# Patient Record
Sex: MALE | Race: WHITE | NOT HISPANIC OR LATINO | Employment: FULL TIME | URBAN - METROPOLITAN AREA
[De-identification: names, ages, dates, MRNs, and addresses within clinical notes are randomized per-mention and may not be internally consistent; named-entity substitution may affect disease eponyms.]

---

## 2017-01-26 ENCOUNTER — TELEPHONE (OUTPATIENT)
Dept: FAMILY MEDICINE | Facility: CLINIC | Age: 27
End: 2017-01-26

## 2017-01-26 NOTE — TELEPHONE ENCOUNTER
Call returned to pt.  Pt stated he needed a note to get released back to work.  Pt was advised to go to the person who had him out of work for he has not seen Dr. Cherry in over a year and he would need to come in to be cleared by Dr. Cherry.

## 2017-01-26 NOTE — TELEPHONE ENCOUNTER
----- Message from Ashanti Bassett sent at 1/26/2017 11:06 AM CST -----  Contact: [phone] / self   Patient requesting to speak with you regarding getting released to go back to work. Please advise.

## 2017-07-11 ENCOUNTER — OFFICE VISIT (OUTPATIENT)
Dept: FAMILY MEDICINE | Facility: CLINIC | Age: 27
End: 2017-07-11
Payer: COMMERCIAL

## 2017-07-11 VITALS
DIASTOLIC BLOOD PRESSURE: 80 MMHG | TEMPERATURE: 98 F | HEART RATE: 81 BPM | BODY MASS INDEX: 26.61 KG/M2 | OXYGEN SATURATION: 97 % | WEIGHT: 190.06 LBS | SYSTOLIC BLOOD PRESSURE: 154 MMHG | HEIGHT: 71 IN

## 2017-07-11 DIAGNOSIS — F40.10 SOCIAL ANXIETY DISORDER: Primary | ICD-10-CM

## 2017-07-11 DIAGNOSIS — R00.2 PALPITATIONS: ICD-10-CM

## 2017-07-11 DIAGNOSIS — R03.0 TEMPORARY HIGH BLOOD PRESSURE: ICD-10-CM

## 2017-07-11 PROCEDURE — 93005 ELECTROCARDIOGRAM TRACING: CPT | Mod: S$GLB,,, | Performed by: NURSE PRACTITIONER

## 2017-07-11 PROCEDURE — 93010 ELECTROCARDIOGRAM REPORT: CPT | Mod: S$GLB,,, | Performed by: INTERNAL MEDICINE

## 2017-07-11 PROCEDURE — 99213 OFFICE O/P EST LOW 20 MIN: CPT | Mod: S$GLB,,, | Performed by: NURSE PRACTITIONER

## 2017-07-11 PROCEDURE — 99999 PR PBB SHADOW E&M-EST. PATIENT-LVL IV: CPT | Mod: PBBFAC,,, | Performed by: NURSE PRACTITIONER

## 2017-07-11 RX ORDER — PROPRANOLOL HYDROCHLORIDE 10 MG/1
10 TABLET ORAL 2 TIMES DAILY PRN
Qty: 60 TABLET | Refills: 0 | Status: SHIPPED | OUTPATIENT
Start: 2017-07-11 | End: 2017-08-18 | Stop reason: SDUPTHER

## 2017-07-11 RX ORDER — ESCITALOPRAM OXALATE 10 MG/1
10 TABLET ORAL DAILY
Qty: 30 TABLET | Refills: 1 | Status: SHIPPED | OUTPATIENT
Start: 2017-07-11 | End: 2017-08-18 | Stop reason: SDUPTHER

## 2017-07-11 NOTE — PATIENT INSTRUCTIONS
Understanding Social Phobia (Social Anxiety Disorder)     Talk to your healthcare provider about treatment for social phobia.     You have to give a presentation next week. Just thinking about it makes your heart race. Your throat gets tight, and you can hardly breathe. Sometimes, you even feel faint. Speaking in front of a group makes most people nervous, but your fear is beyond reason. This is nothing to be ashamed of. You may have an anxiety disorder known as social phobia. Talk to your doctor or mental health professional. He or she can offer treatment and support.  What is social phobia?  Social phobia (also called social anxiety disorder) is an intense fear of being humiliated in a social or work setting. Even if you realize that your worries are irrational, you still expect people to  and think poorly of you. To avoid the anxiety, you may stay away from group settings. This avoidance can create problems with relationships, your job, and many other parts of your life. And it can make life much less enjoyable.  What causes it?  The exact cause of social phobia isnt known. The disorder may run in families. The balance of certain chemicals in your brain may also play a role. And an embarrassing or traumatic event may trigger social phobias in some people. Sometimes, the social phobia may not appear until years after this event.  How does social phobia feel?  People with social phobia are very conscious of how others see them. In a social setting, symptoms may include:  · Self-conscious thoughts (You may think all eyes are on you)  · Shakiness, nervousness, or a trembling voice  · Sweating and blushing  · An increased heartbeat  · Shortness of breath  · A headache or stomachache  · Muscle tension  · A dry mouth  Getting help  Asking for help may be very hard for you, but the effort will be worth it. Certain medications may lessen your symptoms. And behavioral therapy may help you conquer your fears. This  involves working with your therapist to learn how to relax when you feel anxious. Youll also slowly begin to confront your fears. At first, you may just think about the situations that scare you. Later, you may face them in person. For instance, you might start by giving a speech in front of one friend, and then gradually in front of larger groups. With each step, youll become less afraid.   Date Last Reviewed: 2/5/2015 © 2000-2016 [email or web address]. [address] [place] [address], [place] [address]. All rights reserved. This information is not intended as a substitute for professional medical care. Always follow your healthcare professional's instructions.

## 2017-07-11 NOTE — PROGRESS NOTES
Subjective:       Patient ID: Andrew Montes is a 27 y.o. male.    Chief Complaint: Anxiety (started in 2008 but has gotten worse last 6 to 7 months,feel nerves,can't focus,have not had an appetite in last 2 days,hands sweats alot when around people,)    ###NEW PATIENT to me - old patient of Dr. Cherry###    Patient is here today with complaint of social anxiety/social phobia but symptoms are worsening and starting to affect personal and social life.  Reports a history of this in the past and was seeing Dr. Cherry and was put on Zoloft in the past but found that the medication was ineffective and did not help.  Reports he has managed to control symptoms in the past but now reports that he is no longer able to control the anxiety and he is completely avoiding social gatherings and avoiding hanging out with friends, etc.  Very anxiety, palpitations and increased sweating at work.  He has had labs checked in past and thyroid labs were normal.  Would like to trial a different medication but can not have any medication that causes sedation due to working as a [employer].            Previous Medications    MULTIVITAMIN (THERAGRAN) PER TABLET    Take 1 tablet by mouth once daily.       Past Medical History:   Diagnosis Date    Hyperhidrosis     Kidney stone     Pars defect of lumbar spine     L4    Social anxiety disorder        Past Surgical History:   Procedure Laterality Date    EYE SURGERY      foreign body removal    KIDNEY STONE SURGERY         Family History   Problem Relation Age of Onset    Anxiety disorder Mother     No Known Problems Father     No Known Problems Sister        Social History     Social History    Marital status: Single     Spouse name: N/A    Number of children: N/A    Years of education: N/A     Occupational History          Social History Main Topics    Smoking status: Never Smoker    Smokeless tobacco: Current User    Alcohol use No    Drug use: No    Sexual activity: Not  "Asked     Other Topics Concern    None     Social History Narrative    None       Review of Systems   Constitutional: Negative for activity change, appetite change, fatigue, fever and unexpected weight change.   HENT: Negative for congestion, ear pain, mouth sores, nosebleeds, postnasal drip, rhinorrhea, sinus pressure, sneezing, sore throat, trouble swallowing and voice change.    Eyes: Negative.    Respiratory: Negative for cough, chest tightness and shortness of breath.    Cardiovascular: Negative for chest pain, palpitations and leg swelling.   Gastrointestinal: Negative.  Negative for abdominal pain, blood in stool, constipation, diarrhea, nausea and vomiting.   Endocrine: Negative.    Genitourinary: Negative for difficulty urinating, dysuria, flank pain, hematuria and urgency.   Musculoskeletal: Negative for arthralgias, back pain, gait problem, joint swelling, myalgias and neck pain.   Skin: Negative for color change, rash and wound.   Allergic/Immunologic: Negative for immunocompromised state.   Neurological: Negative for dizziness, tremors, seizures, syncope, speech difficulty and headaches.   Hematological: Negative for adenopathy. Does not bruise/bleed easily.   Psychiatric/Behavioral: Positive for decreased concentration, dysphoric mood and sleep disturbance. Negative for behavioral problems, hallucinations, self-injury and suicidal ideas. The patient is nervous/anxious.          Objective:     Vitals:    07/11/17 1415   BP: (!) 154/80   BP Location: Left arm   Patient Position: Sitting   BP Method: Manual   Pulse: 81   Temp: 97.7 °F (36.5 °C)   TempSrc: Oral   SpO2: 97%   Weight: 86.2 kg (190 lb 0.6 oz)   Height: 5' 11" (1.803 m)          Physical Exam   Constitutional: He is oriented to person, place, and time. He appears well-developed and well-nourished.   HENT:   Head: Normocephalic.   Right Ear: External ear normal.   Left Ear: External ear normal.   Nose: Nose normal.   Mouth/Throat: Oropharynx " is clear and moist. No oropharyngeal exudate.   Eyes: EOM are normal. Pupils are equal, round, and reactive to light. Right eye exhibits no discharge. Left eye exhibits no discharge. No scleral icterus.   Neck: Normal range of motion. Neck supple. No tracheal deviation present. No thyromegaly present.   Cardiovascular: Normal rate, regular rhythm and normal heart sounds.    No murmur heard.  Pulmonary/Chest: Effort normal and breath sounds normal. No respiratory distress.   Abdominal: Soft. He exhibits no distension.   Musculoskeletal: Normal range of motion. He exhibits no edema.   Lymphadenopathy:     He has no cervical adenopathy.   Neurological: He is alert and oriented to person, place, and time. Coordination normal.   Skin: Skin is warm and dry. No rash noted.   Psychiatric: His behavior is normal. His mood appears anxious. His speech is rapid and/or pressured. He is not actively hallucinating.         Assessment:         ICD-10-CM ICD-9-CM   1. Social anxiety disorder F40.10 300.23   2. Palpitations R00.2 785.1   3. Temporary high blood pressure R03.0 796.2       Plan:       Social anxiety disorder  -  Long discussion on medication options.  Will trial Lexapro 10 mg daily - advised that it will take about 2 weeks to note the benefits of medication.  Advised patient that also due to acute social anxiety related to certain social gatherings that cause palpitations, excess sweating, hands shaking, etc - will try taking Propranolol 10 mg 30 minutes prior to event to see if this may help to control symptoms.  Return in 6 weeks to recheck  -     escitalopram oxalate (LEXAPRO) 10 MG tablet; Take 1 tablet (10 mg total) by mouth once daily.  Dispense: 30 tablet; Refill: 1    Palpitations  -  Baseline EKG:  NSR with sinus arrhythmia - normal EKG  -     propranolol (INDERAL) 10 MG tablet; Take 1 tablet (10 mg total) by mouth 2 (two) times daily as needed (palpitations, anxiousness).  Dispense: 60 tablet; Refill: 0  -      IN OFFICE EKG 12-LEAD (to Muse)    Temporary high blood pressure  -  Suspect related to anxiety since review of other blood pressures during other [facility] visits were within range - will recheck in 6 weeks.      Return in about 6 weeks (around 8/22/2017) for follow up.     Patient's Medications   New Prescriptions    ESCITALOPRAM OXALATE (LEXAPRO) 10 MG TABLET    Take 1 tablet (10 mg total) by mouth once daily.    PROPRANOLOL (INDERAL) 10 MG TABLET    Take 1 tablet (10 mg total) by mouth 2 (two) times daily as needed (palpitations, anxiousness).   Previous Medications    MULTIVITAMIN (THERAGRAN) PER TABLET    Take 1 tablet by mouth once daily.   Modified Medications    No medications on file   Discontinued Medications    OXYBUTYNIN (DITROPAN) 5 MG TAB    Take 1 tablet (5 mg total) by mouth 3 (three) times daily as needed (bladder spasms).    TAMSULOSIN (FLOMAX) 0.4 MG CP24    Take 1 capsule (0.4 mg total) by mouth after dinner.

## 2017-07-13 ENCOUNTER — OFFICE VISIT (OUTPATIENT)
Dept: FAMILY MEDICINE | Facility: CLINIC | Age: 27
End: 2017-07-13
Payer: COMMERCIAL

## 2017-07-13 ENCOUNTER — LAB VISIT (OUTPATIENT)
Dept: LAB | Facility: HOSPITAL | Age: 27
End: 2017-07-13
Attending: NURSE PRACTITIONER
Payer: COMMERCIAL

## 2017-07-13 VITALS
BODY MASS INDEX: 26.51 KG/M2 | HEIGHT: 71 IN | DIASTOLIC BLOOD PRESSURE: 80 MMHG | OXYGEN SATURATION: 99 % | HEART RATE: 75 BPM | SYSTOLIC BLOOD PRESSURE: 130 MMHG | WEIGHT: 189.38 LBS | TEMPERATURE: 98 F

## 2017-07-13 DIAGNOSIS — R06.02 SHORTNESS OF BREATH: ICD-10-CM

## 2017-07-13 DIAGNOSIS — R00.2 PALPITATIONS: ICD-10-CM

## 2017-07-13 DIAGNOSIS — R07.89 ATYPICAL CHEST PAIN: ICD-10-CM

## 2017-07-13 DIAGNOSIS — F41.0 PANIC ATTACK: Primary | ICD-10-CM

## 2017-07-13 LAB
ALBUMIN SERPL BCP-MCNC: 4.7 G/DL
ALP SERPL-CCNC: 61 U/L
ALT SERPL W/O P-5'-P-CCNC: 53 U/L
ANION GAP SERPL CALC-SCNC: 11 MMOL/L
AST SERPL-CCNC: 31 U/L
BASOPHILS # BLD AUTO: 0.03 K/UL
BASOPHILS NFR BLD: 0.3 %
BILIRUB SERPL-MCNC: 1.7 MG/DL
BUN SERPL-MCNC: 8 MG/DL
CALCIUM SERPL-MCNC: 9.7 MG/DL
CHLORIDE SERPL-SCNC: 104 MMOL/L
CK SERPL-CCNC: 122 U/L
CO2 SERPL-SCNC: 28 MMOL/L
CREAT SERPL-MCNC: 1 MG/DL
DIFFERENTIAL METHOD: ABNORMAL
EOSINOPHIL # BLD AUTO: 0 K/UL
EOSINOPHIL NFR BLD: 0.3 %
ERYTHROCYTE [DISTWIDTH] IN BLOOD BY AUTOMATED COUNT: 12 %
EST. GFR  (AFRICAN AMERICAN): >60 ML/MIN/1.73 M^2
EST. GFR  (NON AFRICAN AMERICAN): >60 ML/MIN/1.73 M^2
GLUCOSE SERPL-MCNC: 103 MG/DL
HCT VFR BLD AUTO: 41.5 %
HGB BLD-MCNC: 14.9 G/DL
LYMPHOCYTES # BLD AUTO: 2.4 K/UL
LYMPHOCYTES NFR BLD: 20.4 %
MCH RBC QN AUTO: 32 PG
MCHC RBC AUTO-ENTMCNC: 35.9 %
MCV RBC AUTO: 89 FL
MONOCYTES # BLD AUTO: 0.9 K/UL
MONOCYTES NFR BLD: 7.8 %
NEUTROPHILS # BLD AUTO: 8.5 K/UL
NEUTROPHILS NFR BLD: 71 %
PLATELET # BLD AUTO: 261 K/UL
PMV BLD AUTO: 10 FL
POTASSIUM SERPL-SCNC: 3.7 MMOL/L
PROT SERPL-MCNC: 7.4 G/DL
RBC # BLD AUTO: 4.66 M/UL
SODIUM SERPL-SCNC: 143 MMOL/L
TSH SERPL DL<=0.005 MIU/L-ACNC: 0.78 UIU/ML
WBC # BLD AUTO: 11.96 K/UL

## 2017-07-13 PROCEDURE — 99999 PR PBB SHADOW E&M-EST. PATIENT-LVL IV: CPT | Mod: PBBFAC,,, | Performed by: NURSE PRACTITIONER

## 2017-07-13 PROCEDURE — 36415 COLL VENOUS BLD VENIPUNCTURE: CPT

## 2017-07-13 PROCEDURE — 82550 ASSAY OF CK (CPK): CPT

## 2017-07-13 PROCEDURE — 99213 OFFICE O/P EST LOW 20 MIN: CPT | Mod: S$GLB,,, | Performed by: NURSE PRACTITIONER

## 2017-07-13 PROCEDURE — 84443 ASSAY THYROID STIM HORMONE: CPT

## 2017-07-13 PROCEDURE — 85025 COMPLETE CBC W/AUTO DIFF WBC: CPT

## 2017-07-13 PROCEDURE — 80053 COMPREHEN METABOLIC PANEL: CPT

## 2017-07-13 RX ORDER — HYDROXYZINE PAMOATE 25 MG/1
25 CAPSULE ORAL EVERY 6 HOURS PRN
Qty: 30 CAPSULE | Refills: 1 | Status: SHIPPED | OUTPATIENT
Start: 2017-07-13 | End: 2017-08-18 | Stop reason: SDUPTHER

## 2017-07-13 NOTE — PROGRESS NOTES
Subjective:       Patient ID: Andrew Montes is a 27 y.o. male.    Chief Complaint: Chest Pain (started this morning with shortness of breath, body feels like wanting to cramp up, muscles feels sore throughout pt body)    Patient is here today with complaint he was supposed to report to work this morning but reports he starting have chest pains/tightness with palpitations, shortness of breath and muscle aches.  Patient has not been working in the heat over the past week so is not symptomatic of heat exhaustion.  Patient just had an EKG 2 days ago for palpitations and anxiety and EKG was NSR with sinus arrhythmia - normal EKG.  Patient has acute social anxiety disorder - he was just started on Lexapro 2 days ago and Propranolol 10 mg prn palpitations.  Patient did not take the Propranolol today for the palpitations - state she has not taken the Propranolol since prescribed.  HR is normal at 75 today.        Chest Pain    This is a new problem. The current episode started today. The onset quality is sudden. The problem occurs constantly. The problem has been unchanged. The pain is present in the substernal region. The pain is at a severity of 3/10. The quality of the pain is described as tightness. The pain does not radiate. Associated symptoms include palpitations and shortness of breath. Pertinent negatives include no abdominal pain, back pain, cough, diaphoresis, dizziness, fever, headaches, irregular heartbeat, nausea, near-syncope, syncope or vomiting. Associated symptoms comments: Myalgias, anxiousness. He has tried nothing for the symptoms. Risk factors include male gender and stress.   His past medical history is significant for anxiety/panic attacks.   Pertinent negatives for past medical history include no seizures. Prior workup: Had EKG 2 days ago - normal sinus rhythm with sinus arrhythmia - normal EKG.       Previous Medications    ESCITALOPRAM OXALATE (LEXAPRO) 10 MG TABLET    Take 1 tablet (10 mg total)  by mouth once daily.    MULTIVITAMIN (THERAGRAN) PER TABLET    Take 1 tablet by mouth once daily.    PROPRANOLOL (INDERAL) 10 MG TABLET    Take 1 tablet (10 mg total) by mouth 2 (two) times daily as needed (palpitations, anxiousness).       Past Medical History:   Diagnosis Date    Hyperhidrosis     Kidney stone     Pars defect of lumbar spine     L4    Social anxiety disorder        Past Surgical History:   Procedure Laterality Date    EYE SURGERY      foreign body removal    KIDNEY STONE SURGERY         Family History   Problem Relation Age of Onset    Anxiety disorder Mother     No Known Problems Father     No Known Problems Sister        Social History     Social History    Marital status: Single     Spouse name: N/A    Number of children: N/A    Years of education: N/A     Occupational History    [employer]      Social History Main Topics    Smoking status: Never Smoker    Smokeless tobacco: Current User    Alcohol use No    Drug use: No    Sexual activity: Not Asked     Other Topics Concern    None     Social History Narrative    None       Review of Systems   Constitutional: Negative for activity change, appetite change, diaphoresis, fatigue, fever and unexpected weight change.   HENT: Negative for congestion, ear pain, mouth sores, nosebleeds, postnasal drip, rhinorrhea, sinus pressure, sneezing, sore throat, trouble swallowing and voice change.    Eyes: Negative.    Respiratory: Positive for shortness of breath. Negative for cough and chest tightness.    Cardiovascular: Positive for chest pain and palpitations. Negative for leg swelling, syncope and near-syncope.   Gastrointestinal: Negative.  Negative for abdominal pain, blood in stool, constipation, diarrhea, nausea and vomiting.   Endocrine: Negative.    Genitourinary: Negative for difficulty urinating, dysuria, flank pain, hematuria and urgency.   Musculoskeletal: Positive for myalgias. Negative for arthralgias, back pain, gait  "problem, joint swelling and neck pain.   Skin: Negative for color change, rash and wound.   Allergic/Immunologic: Negative for immunocompromised state.   Neurological: Negative for dizziness, tremors, seizures, syncope, speech difficulty and headaches.   Hematological: Negative for adenopathy. Does not bruise/bleed easily.   Psychiatric/Behavioral: Negative for behavioral problems, dysphoric mood, sleep disturbance and suicidal ideas. The patient is nervous/anxious.          Objective:     Vitals:    07/13/17 1038   BP: 130/80   BP Location: Right arm   Patient Position: Sitting   BP Method: Manual   Pulse: 75   Temp: 98.3 °F (36.8 °C)   TempSrc: Oral   SpO2: 99%   Weight: 85.9 kg (189 lb 6 oz)   Height: 5' 11" (1.803 m)          Physical Exam   Constitutional: He is oriented to person, place, and time. He appears well-developed and well-nourished.   HENT:   Head: Normocephalic.   Right Ear: External ear normal.   Left Ear: External ear normal.   Nose: Nose normal.   Mouth/Throat: Oropharynx is clear and moist. No oropharyngeal exudate.   Eyes: EOM are normal. Pupils are equal, round, and reactive to light. Right eye exhibits no discharge. Left eye exhibits no discharge. No scleral icterus.   Neck: Normal range of motion. Neck supple. No tracheal deviation present. No thyromegaly present.   Cardiovascular: Normal rate, regular rhythm and normal heart sounds.    No murmur heard.  Pulmonary/Chest: Effort normal and breath sounds normal. No respiratory distress.   Abdominal: Soft. He exhibits no distension.   Musculoskeletal: Normal range of motion. He exhibits no edema.   Lymphadenopathy:     He has no cervical adenopathy.   Neurological: He is alert and oriented to person, place, and time. Coordination normal.   Skin: Skin is warm and dry. No rash noted.   Psychiatric: His behavior is normal. His mood appears anxious. His speech is rapid and/or pressured. He is not actively hallucinating.         Assessment:         " ICD-10-CM ICD-9-CM   1. Panic attack F41.0 300.01   2. Palpitations R00.2 785.1   3. Shortness of breath R06.02 786.05   4. Atypical chest pain R07.89 786.59       Plan:       Panic attack  -  Take Vistaril as needed for acute panic attack.  -     hydrOXYzine pamoate (VISTARIL) 25 MG Cap; Take 1 capsule (25 mg total) by mouth every 6 (six) hours as needed (anxiety).  Dispense: 30 capsule; Refill: 1    Palpitations  -  Patient had normal thyroid when checked but that was over 1 year ago - will get labs to rule out other causes of palpitation and anxiety.    -     Comprehensive metabolic panel; Future; Expected date: 07/13/2017  -     TSH; Future; Expected date: 07/13/2017  -     CBC auto differential; Future; Expected date: 07/13/2017  -     CK; Future; Expected date: 07/13/2017    Shortness of breath    Atypical Chest Pain  -  Suspect this is secondary to anxiety - but will get some labs and CK level.  Advised to take Propranolol for palpitations and Vistaril as needed for anxiety attack.  If chest pain worsens or does not get relief, go to ER.    Return in about 6 weeks (around 8/24/2017).     Patient's Medications   New Prescriptions    HYDROXYZINE PAMOATE (VISTARIL) 25 MG CAP    Take 1 capsule (25 mg total) by mouth every 6 (six) hours as needed (anxiety).   Previous Medications    ESCITALOPRAM OXALATE (LEXAPRO) 10 MG TABLET    Take 1 tablet (10 mg total) by mouth once daily.    MULTIVITAMIN (THERAGRAN) PER TABLET    Take 1 tablet by mouth once daily.    PROPRANOLOL (INDERAL) 10 MG TABLET    Take 1 tablet (10 mg total) by mouth 2 (two) times daily as needed (palpitations, anxiousness).   Modified Medications    No medications on file   Discontinued Medications    No medications on file

## 2017-08-18 ENCOUNTER — OFFICE VISIT (OUTPATIENT)
Dept: FAMILY MEDICINE | Facility: CLINIC | Age: 27
End: 2017-08-18
Payer: COMMERCIAL

## 2017-08-18 VITALS
HEIGHT: 71 IN | WEIGHT: 185.19 LBS | BODY MASS INDEX: 25.93 KG/M2 | SYSTOLIC BLOOD PRESSURE: 120 MMHG | DIASTOLIC BLOOD PRESSURE: 78 MMHG | TEMPERATURE: 98 F | OXYGEN SATURATION: 99 %

## 2017-08-18 DIAGNOSIS — R00.2 PALPITATIONS: ICD-10-CM

## 2017-08-18 DIAGNOSIS — F40.10 SOCIAL ANXIETY DISORDER: ICD-10-CM

## 2017-08-18 DIAGNOSIS — F41.0 PANIC ATTACK: ICD-10-CM

## 2017-08-18 PROCEDURE — 3008F BODY MASS INDEX DOCD: CPT | Mod: S$GLB,,, | Performed by: NURSE PRACTITIONER

## 2017-08-18 PROCEDURE — 99999 PR PBB SHADOW E&M-EST. PATIENT-LVL III: CPT | Mod: PBBFAC,,, | Performed by: NURSE PRACTITIONER

## 2017-08-18 PROCEDURE — 99213 OFFICE O/P EST LOW 20 MIN: CPT | Mod: S$GLB,,, | Performed by: NURSE PRACTITIONER

## 2017-08-18 RX ORDER — PROPRANOLOL HYDROCHLORIDE 10 MG/1
10 TABLET ORAL 2 TIMES DAILY PRN
Qty: 90 TABLET | Refills: 0 | Status: SHIPPED | OUTPATIENT
Start: 2017-08-18 | End: 2017-11-03 | Stop reason: SDUPTHER

## 2017-08-18 RX ORDER — ESCITALOPRAM OXALATE 10 MG/1
10 TABLET ORAL DAILY
Qty: 90 TABLET | Refills: 0 | Status: SHIPPED | OUTPATIENT
Start: 2017-08-18 | End: 2017-11-03 | Stop reason: SDUPTHER

## 2017-08-18 RX ORDER — HYDROXYZINE PAMOATE 25 MG/1
25 CAPSULE ORAL EVERY 6 HOURS PRN
Qty: 90 CAPSULE | Refills: 0 | Status: SHIPPED | OUTPATIENT
Start: 2017-08-18 | End: 2017-11-03 | Stop reason: SDUPTHER

## 2017-08-18 NOTE — PROGRESS NOTES
Subjective:       Patient ID: Andrew Montes is a 27 y.o. male.    Chief Complaint: Follow-up (med check) and Medication Refill    Patient is here today for follow up.  Patient has social anxiety with panic attacks with symptoms manifesting as chest pains, palpitations and shortness of breath.  Patient was started on Lexapro 10 mg daily, Propranolol prn palpitations, and Vistaril prn panic attacks.  Patient reports that the medications are working well with resolution of symptoms.  Reports the Lexapro started working after 2 weeks and he rarely has to take the Vistaril now.            Previous Medications    ESCITALOPRAM OXALATE (LEXAPRO) 10 MG TABLET    Take 1 tablet (10 mg total) by mouth once daily.    HYDROXYZINE PAMOATE (VISTARIL) 25 MG CAP    Take 1 capsule (25 mg total) by mouth every 6 (six) hours as needed (anxiety).    MULTIVITAMIN (THERAGRAN) PER TABLET    Take 1 tablet by mouth once daily.    PROPRANOLOL (INDERAL) 10 MG TABLET    Take 1 tablet (10 mg total) by mouth 2 (two) times daily as needed (palpitations, anxiousness).       Past Medical History:   Diagnosis Date    Hyperhidrosis     Kidney stone     Pars defect of lumbar spine     L4    Social anxiety disorder        Past Surgical History:   Procedure Laterality Date    EYE SURGERY      foreign body removal    KIDNEY STONE SURGERY         Family History   Problem Relation Age of Onset    Anxiety disorder Mother     No Known Problems Father     No Known Problems Sister        Social History     Social History    Marital status: Single     Spouse name: N/A    Number of children: N/A    Years of education: N/A     Occupational History    [employer]      Social History Main Topics    Smoking status: Never Smoker    Smokeless tobacco: Current User    Alcohol use No    Drug use: No    Sexual activity: Not Asked     Other Topics Concern    None     Social History Narrative    None       Review of Systems   Constitutional: Positive for  "activity change. Negative for unexpected weight change.   HENT: Negative for hearing loss, rhinorrhea and trouble swallowing.    Eyes: Negative for discharge and visual disturbance.   Respiratory: Negative for chest tightness and wheezing.    Cardiovascular: Negative for chest pain and palpitations.   Gastrointestinal: Negative for blood in stool, constipation, diarrhea and vomiting.   Endocrine: Negative for polydipsia and polyuria.   Genitourinary: Negative for difficulty urinating, hematuria and urgency.   Musculoskeletal: Negative for arthralgias, joint swelling and neck pain.   Neurological: Negative for weakness and headaches.   Psychiatric/Behavioral: Negative for confusion and dysphoric mood.         Objective:     Vitals:    08/18/17 1133   BP: 120/78   BP Location: Right arm   Patient Position: Sitting   BP Method: Medium (Manual)   Temp: 98.3 °F (36.8 °C)   TempSrc: Oral   SpO2: 99%   Weight: 84 kg (185 lb 3 oz)   Height: 5' 11" (1.803 m)          Physical Exam   Constitutional: He is oriented to person, place, and time. He appears well-developed and well-nourished.   HENT:   Head: Normocephalic.   Right Ear: External ear normal.   Left Ear: External ear normal.   Nose: Nose normal.   Mouth/Throat: Oropharynx is clear and moist. No oropharyngeal exudate.   Eyes: EOM are normal. Pupils are equal, round, and reactive to light. Right eye exhibits no discharge. Left eye exhibits no discharge. No scleral icterus.   Neck: Normal range of motion. Neck supple. No tracheal deviation present. No thyromegaly present.   Cardiovascular: Normal rate, regular rhythm and normal heart sounds.    No murmur heard.  Pulmonary/Chest: Effort normal and breath sounds normal. No respiratory distress.   Abdominal: Soft. He exhibits no distension.   Musculoskeletal: Normal range of motion. He exhibits no edema.   Lymphadenopathy:     He has no cervical adenopathy.   Neurological: He is alert and oriented to person, place, and " time. Coordination normal.   Skin: Skin is warm and dry. No rash noted.   Psychiatric: He has a normal mood and affect. His behavior is normal.         Assessment:         ICD-10-CM ICD-9-CM   1. Social anxiety disorder F40.10 300.23   2. Panic attack F41.0 300.01   3. Palpitations R00.2 785.1       Plan:       Social anxiety disorder  -  Controlled on present medications and all physical symptoms have resolved.  Follow up in 3 months.  -     escitalopram oxalate (LEXAPRO) 10 MG tablet; Take 1 tablet (10 mg total) by mouth once daily.  Dispense: 90 tablet; Refill: 0    Panic attack  -     hydrOXYzine pamoate (VISTARIL) 25 MG Cap; Take 1 capsule (25 mg total) by mouth every 6 (six) hours as needed (anxiety).  Dispense: 90 capsule; Refill: 0    Palpitations  -  RESOLVED  -     propranolol (INDERAL) 10 MG tablet; Take 1 tablet (10 mg total) by mouth 2 (two) times daily as needed (palpitations, anxiousness).  Dispense: 90 tablet; Refill: 0      Return in about 3 months (around 11/18/2017) for med check.     Patient's Medications   New Prescriptions    No medications on file   Previous Medications    MULTIVITAMIN (THERAGRAN) PER TABLET    Take 1 tablet by mouth once daily.   Modified Medications    Modified Medication Previous Medication    ESCITALOPRAM OXALATE (LEXAPRO) 10 MG TABLET escitalopram oxalate (LEXAPRO) 10 MG tablet       Take 1 tablet (10 mg total) by mouth once daily.    Take 1 tablet (10 mg total) by mouth once daily.    HYDROXYZINE PAMOATE (VISTARIL) 25 MG CAP hydrOXYzine pamoate (VISTARIL) 25 MG Cap       Take 1 capsule (25 mg total) by mouth every 6 (six) hours as needed (anxiety).    Take 1 capsule (25 mg total) by mouth every 6 (six) hours as needed (anxiety).    PROPRANOLOL (INDERAL) 10 MG TABLET propranolol (INDERAL) 10 MG tablet       Take 1 tablet (10 mg total) by mouth 2 (two) times daily as needed (palpitations, anxiousness).    Take 1 tablet (10 mg total) by mouth 2 (two) times daily as needed  (palpitations, anxiousness).   Discontinued Medications    No medications on file

## 2017-09-08 ENCOUNTER — CLINICAL SUPPORT (OUTPATIENT)
Dept: OCCUPATIONAL MEDICINE | Facility: CLINIC | Age: 27
End: 2017-09-08

## 2017-09-08 DIAGNOSIS — Z02.89 ENCOUNTER FOR PHYSICAL EXAMINATION RELATED TO EMPLOYMENT: Primary | ICD-10-CM

## 2017-09-08 PROCEDURE — 99499 UNLISTED E&M SERVICE: CPT | Mod: S$GLB,,, | Performed by: PHYSICIAN ASSISTANT

## 2017-10-24 ENCOUNTER — OFFICE VISIT (OUTPATIENT)
Dept: FAMILY MEDICINE | Facility: CLINIC | Age: 27
End: 2017-10-24
Payer: COMMERCIAL

## 2017-10-24 VITALS
HEART RATE: 87 BPM | DIASTOLIC BLOOD PRESSURE: 60 MMHG | BODY MASS INDEX: 27.59 KG/M2 | WEIGHT: 197.06 LBS | OXYGEN SATURATION: 98 % | SYSTOLIC BLOOD PRESSURE: 114 MMHG | HEIGHT: 71 IN | TEMPERATURE: 99 F

## 2017-10-24 DIAGNOSIS — E80.4 GILBERT'S SYNDROME: ICD-10-CM

## 2017-10-24 DIAGNOSIS — M25.475 BILATERAL SWELLING OF FEET AND ANKLES: Primary | ICD-10-CM

## 2017-10-24 DIAGNOSIS — M25.471 BILATERAL SWELLING OF FEET AND ANKLES: Primary | ICD-10-CM

## 2017-10-24 DIAGNOSIS — F12.91 HISTORY OF MARIJUANA USE: ICD-10-CM

## 2017-10-24 DIAGNOSIS — M25.472 BILATERAL SWELLING OF FEET AND ANKLES: Primary | ICD-10-CM

## 2017-10-24 DIAGNOSIS — F41.0 PANIC ATTACK: ICD-10-CM

## 2017-10-24 DIAGNOSIS — R17 SCLERAL ICTERUS: ICD-10-CM

## 2017-10-24 DIAGNOSIS — M25.474 BILATERAL SWELLING OF FEET AND ANKLES: Primary | ICD-10-CM

## 2017-10-24 PROCEDURE — 99999 PR PBB SHADOW E&M-EST. PATIENT-LVL IV: CPT | Mod: PBBFAC,,, | Performed by: NURSE PRACTITIONER

## 2017-10-24 PROCEDURE — 99213 OFFICE O/P EST LOW 20 MIN: CPT | Mod: S$GLB,,, | Performed by: NURSE PRACTITIONER

## 2017-10-24 NOTE — PROGRESS NOTES
Subjective:       Patient ID: Andrew Montes is a 27 y.o. male.    Chief Complaint: Anxiety (started Friday just got nerves thinking about cousen wedding, was doing laundry, went in depression for 3 days but smoke marijuana friday and saturday night took Propranolol both nights before getting high.) and Foot Swelling (started saturday night both feet swollen and red)    Patient is a 27 year old male with Social Anxiety/Panic Disorder.  He is normally controlled on Lexapro 10 mg daily and Vistaril and Propranolol prn panic attack.  He reports Friday feeling very anxious with depressed mood and felt like he was having a panic attack.  He reports he took a Vistaril and Propranolol but then admits that he smoked marijuana both Friday and Saturday night.  States first time using marijuana in several years.  Report he does not know where the marijuana came from - given by a friend so unsure if it was laced with anything.  He reports he then went into a deep depression and stayed in bed.  He noted bilateral feet and ankle swelling - really red and swollen - but is improved today from over the weekend.      Patient does have  Gilbert Syndrome with elevated bilirubin noted for years and does get jaundice when stressed.  His sclera are yellowed today on exam.             Previous Medications    ESCITALOPRAM OXALATE (LEXAPRO) 10 MG TABLET    Take 1 tablet (10 mg total) by mouth once daily.    HYDROXYZINE PAMOATE (VISTARIL) 25 MG CAP    Take 1 capsule (25 mg total) by mouth every 6 (six) hours as needed (anxiety).    MULTIVITAMIN (THERAGRAN) PER TABLET    Take 1 tablet by mouth once daily.    PROPRANOLOL (INDERAL) 10 MG TABLET    Take 1 tablet (10 mg total) by mouth 2 (two) times daily as needed (palpitations, anxiousness).       Past Medical History:   Diagnosis Date    Hyperhidrosis     Kidney stone     Pars defect of lumbar spine     L4    Social anxiety disorder        Past Surgical History:   Procedure Laterality Date  "   EYE SURGERY      foreign body removal    KIDNEY STONE SURGERY         Family History   Problem Relation Age of Onset    Anxiety disorder Mother     No Known Problems Father     No Known Problems Sister        Social History     Social History    Marital status: Single     Spouse name: N/A    Number of children: N/A    Years of education: N/A     Occupational History    [employer]      Social History Main Topics    Smoking status: Former Smoker     Types: Vaping with nicotine    Smokeless tobacco: Never Used    Alcohol use No    Drug use:      Types: Marijuana      Comment: Friday and Saturday 10/21&22 first use of marijuana in several years    Sexual activity: Not Asked     Other Topics Concern    None     Social History Narrative    None       Review of Systems   Eyes:        Eyes yellow   Psychiatric/Behavioral: Positive for dysphoric mood. Negative for self-injury. The patient is nervous/anxious.          Objective:     Vitals:    10/24/17 1413   BP: 114/60   BP Location: Right arm   Patient Position: Sitting   BP Method: Medium (Manual)   Pulse: 87   Temp: 98.7 °F (37.1 °C)   TempSrc: Oral   SpO2: 98%   Weight: 89.4 kg (197 lb 1.5 oz)   Height: 5' 11" (1.803 m)          Physical Exam   Constitutional: He is oriented to person, place, and time. He appears well-developed and well-nourished.   HENT:   Head: Normocephalic.   Right Ear: External ear normal.   Left Ear: External ear normal.   Nose: Nose normal.   Mouth/Throat: Oropharynx is clear and moist. No oropharyngeal exudate.   Eyes: EOM are normal. Pupils are equal, round, and reactive to light. Right eye exhibits no discharge. Left eye exhibits no discharge. Scleral icterus is present.   Neck: Normal range of motion. Neck supple. No tracheal deviation present. No thyromegaly present.   Cardiovascular: Normal rate, regular rhythm and normal heart sounds.    No murmur heard.  Pulmonary/Chest: Effort normal and breath sounds normal. No " respiratory distress.   Abdominal: Soft. He exhibits no distension.   Musculoskeletal: Normal range of motion. He exhibits edema.   Bilateral feet and ankle swelling present - erythematous and swollen but not hot to the touch.  +2 pedal pulse present.   Lymphadenopathy:     He has no cervical adenopathy.   Neurological: He is alert and oriented to person, place, and time. Coordination normal.   Skin: Skin is warm and dry. No rash noted.   Psychiatric: He has a normal mood and affect. His behavior is normal.   Patient is calm today with normal behavior.                 Assessment:         ICD-10-CM ICD-9-CM   1. Bilateral swelling of feet and ankles M25.473 719.07    M25.476    2. Scleral icterus R17 782.4   3. Gilbert's syndrome E80.4 277.4   4. Panic attack F41.0 300.01   5. History of marijuana use Z87.898 305.23       Plan:       Bilateral swelling of feet and ankles  -  hansel Phan MD, in for consult.  Agreed that physical exam not consistent with cellulitis.  The feet swelling could be secondary to the marijuana - unknown if laced with something or secondary to the liver/gilbert's - something he took could have affected liver function.  Since symptoms are improving - agreed to get labs today and monitor the swelling/rednes to feet.  Advised to return in 2 weeks to recheck or sooner if symptoms worsen.  DO NOT USE MARIJUANA in the future.  -     Comprehensive metabolic panel; Future; Expected date: 10/24/2017    Scleral icterus  -     Comprehensive metabolic panel; Future; Expected date: 10/24/2017  -     HEPATITIS PANEL, ACUTE; Future; Expected date: 11/07/2017    Gilbert's syndrome  -     Comprehensive metabolic panel; Future; Expected date: 10/24/2017  -     HEPATITIS PANEL, ACUTE; Future; Expected date: 11/07/2017    Panic attack    History of marijuana use      Return in about 2 weeks (around 11/7/2017) for follow up or sooner if worsening of symptoms.     Patient's Medications   New  Prescriptions    No medications on file   Previous Medications    ESCITALOPRAM OXALATE (LEXAPRO) 10 MG TABLET    Take 1 tablet (10 mg total) by mouth once daily.    HYDROXYZINE PAMOATE (VISTARIL) 25 MG CAP    Take 1 capsule (25 mg total) by mouth every 6 (six) hours as needed (anxiety).    MULTIVITAMIN (THERAGRAN) PER TABLET    Take 1 tablet by mouth once daily.    PROPRANOLOL (INDERAL) 10 MG TABLET    Take 1 tablet (10 mg total) by mouth 2 (two) times daily as needed (palpitations, anxiousness).   Modified Medications    No medications on file   Discontinued Medications    No medications on file

## 2017-10-25 ENCOUNTER — LAB VISIT (OUTPATIENT)
Dept: LAB | Facility: HOSPITAL | Age: 27
End: 2017-10-25
Attending: NURSE PRACTITIONER
Payer: COMMERCIAL

## 2017-10-25 DIAGNOSIS — R17 SCLERAL ICTERUS: ICD-10-CM

## 2017-10-25 DIAGNOSIS — M25.471 BILATERAL SWELLING OF FEET AND ANKLES: ICD-10-CM

## 2017-10-25 DIAGNOSIS — M25.475 BILATERAL SWELLING OF FEET AND ANKLES: ICD-10-CM

## 2017-10-25 DIAGNOSIS — M25.474 BILATERAL SWELLING OF FEET AND ANKLES: ICD-10-CM

## 2017-10-25 DIAGNOSIS — M25.472 BILATERAL SWELLING OF FEET AND ANKLES: ICD-10-CM

## 2017-10-25 DIAGNOSIS — E80.4 GILBERT'S SYNDROME: ICD-10-CM

## 2017-10-25 LAB
ALBUMIN SERPL BCP-MCNC: 3.8 G/DL
ALP SERPL-CCNC: 72 U/L
ALT SERPL W/O P-5'-P-CCNC: 32 U/L
ANION GAP SERPL CALC-SCNC: 6 MMOL/L
AST SERPL-CCNC: 24 U/L
BILIRUB SERPL-MCNC: 1.6 MG/DL
BUN SERPL-MCNC: 13 MG/DL
CALCIUM SERPL-MCNC: 9.3 MG/DL
CHLORIDE SERPL-SCNC: 104 MMOL/L
CO2 SERPL-SCNC: 31 MMOL/L
CREAT SERPL-MCNC: 1 MG/DL
EST. GFR  (AFRICAN AMERICAN): >60 ML/MIN/1.73 M^2
EST. GFR  (NON AFRICAN AMERICAN): >60 ML/MIN/1.73 M^2
GLUCOSE SERPL-MCNC: 102 MG/DL
HAV IGM SERPL QL IA: NEGATIVE
HBV CORE IGM SERPL QL IA: NEGATIVE
HBV SURFACE AG SERPL QL IA: NEGATIVE
HCV AB SERPL QL IA: NEGATIVE
POTASSIUM SERPL-SCNC: 4 MMOL/L
PROT SERPL-MCNC: 7 G/DL
SODIUM SERPL-SCNC: 141 MMOL/L

## 2017-10-25 PROCEDURE — 36415 COLL VENOUS BLD VENIPUNCTURE: CPT

## 2017-10-25 PROCEDURE — 80074 ACUTE HEPATITIS PANEL: CPT

## 2017-10-25 PROCEDURE — 80053 COMPREHEN METABOLIC PANEL: CPT

## 2017-10-30 ENCOUNTER — OFFICE VISIT (OUTPATIENT)
Dept: URGENT CARE | Facility: CLINIC | Age: 27
End: 2017-10-30
Payer: COMMERCIAL

## 2017-10-30 VITALS
SYSTOLIC BLOOD PRESSURE: 123 MMHG | HEIGHT: 71 IN | OXYGEN SATURATION: 100 % | BODY MASS INDEX: 26.6 KG/M2 | TEMPERATURE: 98 F | WEIGHT: 190 LBS | DIASTOLIC BLOOD PRESSURE: 84 MMHG | RESPIRATION RATE: 16 BRPM

## 2017-10-30 DIAGNOSIS — B34.9 VIRAL SYNDROME: Primary | ICD-10-CM

## 2017-10-30 PROCEDURE — 99214 OFFICE O/P EST MOD 30 MIN: CPT | Mod: S$GLB,,, | Performed by: PHYSICIAN ASSISTANT

## 2017-10-30 NOTE — PROGRESS NOTES
"Subjective:       Patient ID: Andrew Montes is a 27 y.o. male.    Vitals:  height is 5' 11" (1.803 m) and weight is 86.2 kg (190 lb). His temperature is 97.7 °F (36.5 °C). His blood pressure is 123/84. His respiration is 16 and oxygen saturation is 100%.     Chief Complaint: Letter for School/Work and Cough    PT NEEDS A NOTE TO RETURN TO WORK. PT REPORTS BEING SICK WITH FEELING FEVERISH, PRODUCTIVE COUGH, AND SINUS PROBLEMS HOWEVER MOST OF THESE SYMPTOMS HAVE PASSED. PT STILL REPORTS A SMALL PRODUCTIVE COUGH BUT NO FEVER OR SORE THROAT.      Cough   This is a new problem. The current episode started in the past 7 days. The problem has been gradually improving. The problem occurs every few hours. The cough is productive of sputum. Pertinent negatives include no chest pain, chills, ear pain, eye redness, fever, headaches, myalgias, rash, sore throat, shortness of breath or wheezing. Nothing aggravates the symptoms. He has tried nothing (NYQUIL) for the symptoms. His past medical history is significant for asthma.     Review of Systems   Constitution: Negative for chills, fever and malaise/fatigue.   HENT: Negative for congestion, ear pain, hoarse voice and sore throat.    Eyes: Negative for discharge and redness.   Cardiovascular: Negative for chest pain, dyspnea on exertion and leg swelling.   Respiratory: Positive for cough (at night) and sputum production. Negative for shortness of breath and wheezing.    Skin: Negative for rash.   Musculoskeletal: Negative for myalgias.   Gastrointestinal: Negative for abdominal pain, diarrhea, nausea and vomiting.   Neurological: Negative for headaches.       Objective:      Physical Exam   Constitutional: He is oriented to person, place, and time. Vital signs are normal. He appears well-developed and well-nourished. No distress.   HENT:   Head: Normocephalic and atraumatic.   Right Ear: Hearing, tympanic membrane, external ear and ear canal normal.   Left Ear: Hearing, " tympanic membrane, external ear and ear canal normal.   Nose: Nose normal. No mucosal edema. Right sinus exhibits no maxillary sinus tenderness and no frontal sinus tenderness. Left sinus exhibits no maxillary sinus tenderness and no frontal sinus tenderness.   Mouth/Throat: Uvula is midline and oropharynx is clear and moist. No oropharyngeal exudate, posterior oropharyngeal edema or posterior oropharyngeal erythema.   Eyes: Conjunctivae, EOM and lids are normal.   Neck: Normal range of motion. Neck supple.   Cardiovascular: Normal rate, regular rhythm and normal heart sounds.  Exam reveals no gallop and no friction rub.    No murmur heard.  Pulmonary/Chest: Effort normal and breath sounds normal. No respiratory distress. He has no wheezes. He has no rales.   Musculoskeletal: Normal range of motion.   Lymphadenopathy:        Head (right side): No submandibular and no tonsillar adenopathy present.        Head (left side): No submandibular and no tonsillar adenopathy present.   Neurological: He is alert and oriented to person, place, and time.   Skin: Skin is warm and dry. No rash noted. No erythema.   Psychiatric: He has a normal mood and affect. His behavior is normal.   Nursing note and vitals reviewed.      Assessment:       1. Viral syndrome        Plan:       Pt states he did not go to work today because he was not feeling well. He took Nyquil before bed last night and his symptoms have since resolved. Pt is just requesting a note to return to work. He otherwise has no complaints or requests at this time.     Viral syndrome      Patient Instructions   You have been diagnosed with a viral syndrome. Viral syndromes are self-limited and usually resolve within 10 days from onset of symptoms. Antibiotics are not effective against viral infections. It is important that you get lots of rest and drink plenty of fluids. Treatment is through symptomatic relief.    Below are suggestions for symptomatic relief:   -Tylenol  every 4 hours OR ibuprofen every 6 hours as needed for pain/fever.   -Salt water gargles to soothe throat pain.   -Chloroseptic spray also helps to numb throat pain.   -Nasal saline spray reduces inflammation and dryness.   -Warm face compresses to help with facial sinus pain/pressure.   -Vicks vapor rub at night.   -Flonase OTC or Nasacort OTC for nasal congestion.   -Simple foods like chicken noodle soup.   -Delsym helps with coughing at night   -Zyrtec/Claritin during the day & Benadryl at night may help with allergies.     If you DO NOT have Hypertension or any history of palpitations, it is ok to take over the counter Sudafed or Mucinex D or Allegra-D or Claritin-D or Zyrtec-D.  If you do take one of the above, it is ok to combine that with plain over the counter Mucinex or Allegra or Claritin or Zyrtec. If, for example, you are taking Zyrtec -D, you can combine that with Mucinex, but not Mucinex-D.  If you are taking Mucinex-D, you can combine that with plain Allegra or Claritin or Zyrtec.   If you DO have Hypertension or palpitations, it is safe to take Coricidin HBP for relief of sinus symptoms.    Please return to clinic if symptoms have not subsided 10-14 days from onset, as your viral infection may have developed into a bacterial infection. It is at that time antibiotics would be indicated.     Please follow up with your primary care provider within 2-5 days if your signs and symptoms have not resolved or worsen.     If your condition worsens or fails to improve we recommend that you receive another evaluation at the emergency room immediately or contact your primary medical clinic to discuss your concerns.   You must understand that you have received an Urgent Care treatment only and that you may be released before all of your medical problems are known or treated. You, the patient, will arrange for follow up care as instructed.         Viral Syndrome (Adult)  A viral illness may cause a number of  "symptoms. The symptoms depend on the part of the body that the virus affects. If it settles in your nose, throat, and lungs, it may cause cough, sore throat, congestion, and sometimes headache. If it settles in your stomach and intestinal tract, it may cause vomiting and diarrhea. Sometimes it causes vague symptoms like "aching all over," feeling tired, loss of appetite, or fever.  A viral illness usually lasts 1 to 2 weeks, but sometimes it lasts longer. In some cases, a more serious infection can look like a viral syndrome in the first few days of the illness. You may need another exam and additional tests to know the difference. Watch for the warning signs listed below.  Home care  Follow these guidelines for taking care of yourself at home:  · If symptoms are severe, rest at home for the first 2 to 3 days.  · Stay away from cigarette smoke - both your smoke and the smoke from others.  · You may use over-the-counter acetaminophen or ibuprofen for fever, muscle aching, and headache, unless another medicine was prescribed for this. If you have chronic liver or kidney disease or ever had a stomach ulcer or GI bleeding, talk with your doctor before using these medicines. No one who is younger than 18 and ill with a fever should take aspirin. It may cause severe disease or death.  · Your appetite may be poor, so a light diet is fine. Avoid dehydration by drinking 8 to 12 8-ounce glasses of fluids each day. This may include water; orange juice; lemonade; apple, grape, and cranberry juice; clear fruit drinks; electrolyte replacement and sports drinks; and decaffeinated teas and coffee. If you have been diagnosed with a kidney disease, ask your doctor how much and what types of fluids you should drink to prevent dehydration. If you have kidney disease, drinking too much fluid can cause it build up in the your body and be dangerous to your health.  · Over-the-counter remedies won't shorten the length of the illness but " may be helpful for cough, sore throat; and nasal and sinus congestion. Don't use decongestants if you have high blood pressure.  Follow-up care  Follow up with your healthcare provider if you do not improve over the next week.  Call 911  Get emergency medical care if any of the following occur:  · Convulsion  · Feeling weak, dizzy, or like you are going to faint  · Chest pain, shortness of breath, wheezing, or difficulty breathing  When to seek medical advice  Call your healthcare provider right away if any of these occur:  · Cough with lots of colored sputum (mucus) or blood in your sputum  · Chest pain, shortness of breath, wheezing, or difficulty breathing  · Severe headache; face, neck, or ear pain  · Severe, constant pain in the lower right side of your belly (abdominal)  · Continued vomiting (cant keep liquids down)  · Frequent diarrhea (more than 5 times a day); blood (red or black color) or mucus in diarrhea  · Feeling weak, dizzy, or like you are going to faint  · Extreme thirst  · Fever of 100.4°F (38°C) or higher, or as directed by your healthcare provider  Date Last Reviewed: 9/25/2015  © 8077-2947 [employer]. [address] [place] [address], [place] [address]. All rights reserved. This information is not intended as a substitute for professional medical care. Always follow your healthcare professional's instructions.

## 2017-10-30 NOTE — LETTER
October 30, 2017      [facility]  [place] [address]  Phone: [phone]  Fax: [phone]       Patient: Andrew Montes   YOB: 1990  Date of Visit: 10/30/2017    To Whom It May Concern:    Luis Montes  was at [facility] on 10/30/2017. He may return to work/school on 10/31/2017 with no restrictions. If you have any questions or concerns, or if I can be of further assistance, please do not hesitate to contact me.    Sincerely,    Kimmy Vera PA-C

## 2017-11-03 ENCOUNTER — PATIENT MESSAGE (OUTPATIENT)
Dept: FAMILY MEDICINE | Facility: CLINIC | Age: 27
End: 2017-11-03

## 2017-11-03 DIAGNOSIS — F41.0 PANIC ATTACK: ICD-10-CM

## 2017-11-03 DIAGNOSIS — F40.10 SOCIAL ANXIETY DISORDER: ICD-10-CM

## 2017-11-03 DIAGNOSIS — R00.2 PALPITATIONS: ICD-10-CM

## 2017-11-03 RX ORDER — ESCITALOPRAM OXALATE 10 MG/1
10 TABLET ORAL DAILY
Qty: 30 TABLET | Refills: 0 | OUTPATIENT
Start: 2017-11-03 | End: 2018-11-03

## 2017-11-03 RX ORDER — ESCITALOPRAM OXALATE 10 MG/1
10 TABLET ORAL DAILY
Qty: 30 TABLET | Refills: 0 | Status: SHIPPED | OUTPATIENT
Start: 2017-11-03 | End: 2017-11-27 | Stop reason: SDUPTHER

## 2017-11-03 RX ORDER — PROPRANOLOL HYDROCHLORIDE 10 MG/1
10 TABLET ORAL 2 TIMES DAILY PRN
Qty: 30 TABLET | Refills: 0 | OUTPATIENT
Start: 2017-11-03 | End: 2018-11-03

## 2017-11-03 RX ORDER — PROPRANOLOL HYDROCHLORIDE 10 MG/1
10 TABLET ORAL 2 TIMES DAILY PRN
Qty: 30 TABLET | Refills: 0 | Status: SHIPPED | OUTPATIENT
Start: 2017-11-03 | End: 2018-06-13 | Stop reason: HOSPADM

## 2017-11-03 RX ORDER — HYDROXYZINE PAMOATE 25 MG/1
25 CAPSULE ORAL EVERY 6 HOURS PRN
Qty: 30 CAPSULE | Refills: 0 | Status: SHIPPED | OUTPATIENT
Start: 2017-11-03 | End: 2018-06-13 | Stop reason: HOSPADM

## 2017-11-03 NOTE — TELEPHONE ENCOUNTER
Receive refill request from [facility] requesting 90 day refill on medication

## 2017-11-09 ENCOUNTER — OFFICE VISIT (OUTPATIENT)
Dept: FAMILY MEDICINE | Facility: CLINIC | Age: 27
End: 2017-11-09
Payer: COMMERCIAL

## 2017-11-09 VITALS
WEIGHT: 185.63 LBS | HEART RATE: 60 BPM | TEMPERATURE: 98 F | HEIGHT: 71 IN | OXYGEN SATURATION: 99 % | BODY MASS INDEX: 25.99 KG/M2 | DIASTOLIC BLOOD PRESSURE: 68 MMHG | SYSTOLIC BLOOD PRESSURE: 108 MMHG

## 2017-11-09 DIAGNOSIS — M25.472 BILATERAL SWELLING OF FEET AND ANKLES: ICD-10-CM

## 2017-11-09 DIAGNOSIS — F40.10 SOCIAL ANXIETY DISORDER: Primary | ICD-10-CM

## 2017-11-09 DIAGNOSIS — M25.474 BILATERAL SWELLING OF FEET AND ANKLES: ICD-10-CM

## 2017-11-09 DIAGNOSIS — M25.475 BILATERAL SWELLING OF FEET AND ANKLES: ICD-10-CM

## 2017-11-09 DIAGNOSIS — M25.471 BILATERAL SWELLING OF FEET AND ANKLES: ICD-10-CM

## 2017-11-09 DIAGNOSIS — E80.4 GILBERT'S SYNDROME: ICD-10-CM

## 2017-11-09 DIAGNOSIS — F41.0 PANIC ATTACK: ICD-10-CM

## 2017-11-09 PROCEDURE — 99213 OFFICE O/P EST LOW 20 MIN: CPT | Mod: S$GLB,,, | Performed by: NURSE PRACTITIONER

## 2017-11-09 PROCEDURE — 99999 PR PBB SHADOW E&M-EST. PATIENT-LVL IV: CPT | Mod: PBBFAC,,, | Performed by: NURSE PRACTITIONER

## 2017-11-09 RX ORDER — PROMETHAZINE HYDROCHLORIDE 25 MG/1
SUPPOSITORY RECTAL
Refills: 0 | COMMUNITY
Start: 2017-11-06 | End: 2018-06-13 | Stop reason: HOSPADM

## 2017-11-09 NOTE — PROGRESS NOTES
Subjective:       Patient ID: Andrew Montes is a 27 y.o. male.    Chief Complaint: Follow-up (medication)    Patient is here today for follow up.    At last visit, patient had significant bilateral feet and ankle swelling with redness - only thing different was that patient had used a synthetic marijuana that weekend so questionable side effect.  The bilateral foot and ankle swelling has completely resolved.    Patient is a 27 year old male with Social Anxiety/Panic Disorder.  He is normally controlled on Lexapro 10 mg daily and Vistaril and Propranolol prn panic attack. Patient reports he has sought help for synthetic marijuana use and anxiety disorder with [facility] and has an appointment on Monday next week - thinks he will be accepted into [facility] for rehab.  States that [facility] should start managing his psych. Medications but would like remain with me as PCP which I have agreed to.    Patient does have Gilbert Syndrome with elevated bilirubin - checked this at last visit at end of October due to the feet swelling and eyes were jaundiced - bilirubin high at 1.6 but rest of liver enzymes were normal and hepatitis panel was negative.        Component      Latest Ref Rng & Units 10/25/2017 7/13/2017 5/24/2016 2/24/2016   Sodium      136 - 145 mmol/L 141 143 139 139   Potassium      3.5 - 5.1 mmol/L 4.0 3.7 3.8 3.8   Chloride      95 - 110 mmol/L 104 104 103 103   CO2      23 - 29 mmol/L 31 (H) 28 23 29   Glucose      70 - 110 mg/dL 102 103 121 (H) 80   BUN, Bld      6 - 20 mg/dL 13 8 15 9   Creatinine      0.5 - 1.4 mg/dL 1.0 1.0 1.2 0.9   Calcium      8.7 - 10.5 mg/dL 9.3 9.7 9.7 9.4   Total Protein      6.0 - 8.4 g/dL 7.0 7.4 7.1 6.8   Albumin      3.5 - 5.2 g/dL 3.8 4.7 4.7 4.4   Total Bilirubin      0.1 - 1.0 mg/dL 1.6 (H) 1.7 (H) 3.8 (H) 2.0 (H)   Alkaline Phosphatase      55 - 135 U/L 72 61 70 80   AST      10 - 40 U/L 24 31 24 21   ALT      10 - 44 U/L 32 53 (H) 22 23   Anion Gap      8 - 16 mmol/L 6  (L) 11 13 7 (L)   eGFR if African American      >60 mL/min/1.73 m:2 >60 >60 >60 >60   eGFR if non African American      >60 mL/min/1.73 m:2 >60 >60 >60 >60   Hepatitis B Surface Ag       Negative      Hep B C IgM       Negative      Hep A IgM       Negative      Hepatitis C Ab       Negative          Previous Medications    ESCITALOPRAM OXALATE (LEXAPRO) 10 MG TABLET    Take 1 tablet (10 mg total) by mouth once daily.    HYDROXYZINE PAMOATE (VISTARIL) 25 MG CAP    Take 1 capsule (25 mg total) by mouth every 6 (six) hours as needed (anxiety).    MULTIVITAMIN (THERAGRAN) PER TABLET    Take 1 tablet by mouth once daily.    PROMETHEGAN 25 MG SUPPOSITORY    UNW AND I 1 SUP REC UTD    PROPRANOLOL (INDERAL) 10 MG TABLET    Take 1 tablet (10 mg total) by mouth 2 (two) times daily as needed (palpitations, anxiousness).       Past Medical History:   Diagnosis Date    Gilbert's syndrome     Hyperhidrosis     Kidney stone     Pars defect of lumbar spine     L4    Social anxiety disorder        Past Surgical History:   Procedure Laterality Date    EYE SURGERY      foreign body removal    KIDNEY STONE SURGERY         Family History   Problem Relation Age of Onset    Anxiety disorder Mother     No Known Problems Father     No Known Problems Sister        Social History     Social History    Marital status: Single     Spouse name: N/A    Number of children: N/A    Years of education: N/A     Occupational History    [employer]      Social History Main Topics    Smoking status: Former Smoker    Smokeless tobacco: Never Used    Alcohol use Yes      Comment: OCCAISIONALLY    Drug use: No      Comment: Friday and Saturday 10/21&22 first use of marijuana in several years    Sexual activity: Not Asked     Other Topics Concern    None     Social History Narrative    None       Review of Systems   Constitutional: Negative for activity change, appetite change, fatigue, fever and unexpected weight change.   HENT: Negative  "for congestion, ear pain, mouth sores, nosebleeds, postnasal drip, rhinorrhea, sinus pressure, sneezing, sore throat, trouble swallowing and voice change.    Eyes: Negative.    Respiratory: Negative for cough, chest tightness and shortness of breath.    Cardiovascular: Negative for chest pain, palpitations and leg swelling.   Gastrointestinal: Negative.  Negative for abdominal pain, blood in stool, constipation, diarrhea, nausea and vomiting.   Endocrine: Negative.    Genitourinary: Negative for difficulty urinating, dysuria, flank pain, hematuria and urgency.   Musculoskeletal: Negative for arthralgias, back pain, gait problem, joint swelling, myalgias and neck pain.   Skin: Negative for color change, rash and wound.   Allergic/Immunologic: Negative for immunocompromised state.   Neurological: Negative for dizziness, tremors, seizures, syncope, speech difficulty and headaches.   Hematological: Negative for adenopathy. Does not bruise/bleed easily.   Psychiatric/Behavioral: Negative for behavioral problems, dysphoric mood, sleep disturbance and suicidal ideas. The patient is nervous/anxious.          Objective:     Vitals:    11/09/17 1453   BP: 108/68   BP Location: Left arm   Patient Position: Sitting   BP Method: Medium (Manual)   Pulse: 60   Temp: 98.3 °F (36.8 °C)   TempSrc: Oral   SpO2: 99%   Weight: 84.2 kg (185 lb 10 oz)   Height: 5' 11" (1.803 m)          Physical Exam   Constitutional: He is oriented to person, place, and time. He appears well-developed and well-nourished.   HENT:   Head: Normocephalic.   Right Ear: External ear normal.   Left Ear: External ear normal.   Nose: Nose normal.   Mouth/Throat: Oropharynx is clear and moist. No oropharyngeal exudate.   Eyes: EOM are normal. Pupils are equal, round, and reactive to light. Right eye exhibits no discharge. Left eye exhibits no discharge. No scleral icterus.   Neck: Normal range of motion. Neck supple. No tracheal deviation present. No thyromegaly " present.   Cardiovascular: Normal rate, regular rhythm and normal heart sounds.    No murmur heard.  Pulmonary/Chest: Effort normal and breath sounds normal. No respiratory distress.   Abdominal: Soft. He exhibits no distension.   Musculoskeletal: Normal range of motion. He exhibits no edema.   Lymphadenopathy:     He has no cervical adenopathy.   Neurological: He is alert and oriented to person, place, and time. Coordination normal.   Skin: Skin is warm and dry. No rash noted.   Psychiatric: He has a normal mood and affect. His behavior is normal.             Assessment:         ICD-10-CM ICD-9-CM   1. Social anxiety disorder F40.10 300.23   2. Panic attack F41.0 300.01   3. Bilateral swelling of feet and ankles M25.473 719.07    M25.476    4. Gilbert's syndrome E80.4 277.4       Plan:       Social anxiety disorder  -  Patient is seeking treatment at [facility] for management of anxiety/panic disorders as well as recreational use of synthetic marijuana.    Panic attack    Bilateral swelling of feet and ankles  -  RESOLVED    Gilbert's syndrome  -  No intervention necessary      Return for keep appointment with [facility] for psych. management..     Patient's Medications   New Prescriptions    No medications on file   Previous Medications    ESCITALOPRAM OXALATE (LEXAPRO) 10 MG TABLET    Take 1 tablet (10 mg total) by mouth once daily.    HYDROXYZINE PAMOATE (VISTARIL) 25 MG CAP    Take 1 capsule (25 mg total) by mouth every 6 (six) hours as needed (anxiety).    MULTIVITAMIN (THERAGRAN) PER TABLET    Take 1 tablet by mouth once daily.    PROMETHEGAN 25 MG SUPPOSITORY    UNW AND I 1 SUP REC UTD    PROPRANOLOL (INDERAL) 10 MG TABLET    Take 1 tablet (10 mg total) by mouth 2 (two) times daily as needed (palpitations, anxiousness).   Modified Medications    No medications on file   Discontinued Medications    No medications on file

## 2017-11-25 DIAGNOSIS — F40.10 SOCIAL ANXIETY DISORDER: ICD-10-CM

## 2017-11-27 RX ORDER — ESCITALOPRAM OXALATE 10 MG/1
10 TABLET ORAL DAILY
Qty: 90 TABLET | Refills: 0 | Status: SHIPPED | OUTPATIENT
Start: 2017-11-27 | End: 2018-06-13 | Stop reason: HOSPADM

## 2018-02-07 DIAGNOSIS — F40.10 SOCIAL ANXIETY DISORDER: ICD-10-CM

## 2018-02-07 DIAGNOSIS — R00.2 PALPITATIONS: ICD-10-CM

## 2018-02-07 DIAGNOSIS — F41.0 PANIC ATTACK: ICD-10-CM

## 2018-02-07 RX ORDER — ESCITALOPRAM OXALATE 10 MG/1
10 TABLET ORAL DAILY
Qty: 90 TABLET | Refills: 0 | OUTPATIENT
Start: 2018-02-07 | End: 2019-02-07

## 2018-02-07 RX ORDER — PROPRANOLOL HYDROCHLORIDE 10 MG/1
10 TABLET ORAL 2 TIMES DAILY PRN
Qty: 30 TABLET | Refills: 0 | OUTPATIENT
Start: 2018-02-07 | End: 2019-02-07

## 2018-02-07 RX ORDER — HYDROXYZINE PAMOATE 25 MG/1
25 CAPSULE ORAL EVERY 6 HOURS PRN
Qty: 30 CAPSULE | Refills: 0 | OUTPATIENT
Start: 2018-02-07

## 2018-02-07 NOTE — TELEPHONE ENCOUNTER
Patient was seeing psychiatry at [facility] for medical management - he should get his refills from there.

## 2018-02-22 DIAGNOSIS — F40.10 SOCIAL ANXIETY DISORDER: ICD-10-CM

## 2018-02-22 RX ORDER — ESCITALOPRAM OXALATE 10 MG/1
10 TABLET ORAL DAILY
Qty: 90 TABLET | Refills: 0 | OUTPATIENT
Start: 2018-02-22 | End: 2019-02-22

## 2018-02-22 NOTE — TELEPHONE ENCOUNTER
Patient was supposed to have appointment with psych for further management that should be prescribing this - call patient to confirm that he is seeing psych and tell him to get his specialist to prescribe.

## 2018-03-07 ENCOUNTER — TELEPHONE (OUTPATIENT)
Dept: INTERNAL MEDICINE | Facility: CLINIC | Age: 28
End: 2018-03-07

## 2018-03-07 NOTE — TELEPHONE ENCOUNTER
Also called patient to follow up    No answer, left VM that I am happy to see him for his other medical issues and he should keep appointment with me, just want to get psych issues addressed ASAP

## 2018-03-07 NOTE — TELEPHONE ENCOUNTER
Patient called and informed to go through ER for his depression and suicidal thoughts per verbal order Dr. Long.  I informed patient that the ER can assist with getting him in with Psych sooner for meds and or counseling.  Patient verbalized understanding.  When asked if he will go to ER he stated yes.  I asked patient to follow  up with us once he goes to ER. Patient stated that if she is not his physician why?  I informed the patient that she can still be his primary doctor and we just wanted to monitor his progress.

## 2018-04-27 ENCOUNTER — OFFICE VISIT (OUTPATIENT)
Dept: URGENT CARE | Facility: CLINIC | Age: 28
End: 2018-04-27
Payer: COMMERCIAL

## 2018-04-27 VITALS
OXYGEN SATURATION: 97 % | BODY MASS INDEX: 25.9 KG/M2 | HEART RATE: 112 BPM | RESPIRATION RATE: 16 BRPM | TEMPERATURE: 98 F | SYSTOLIC BLOOD PRESSURE: 143 MMHG | HEIGHT: 71 IN | DIASTOLIC BLOOD PRESSURE: 95 MMHG | WEIGHT: 185 LBS

## 2018-04-27 DIAGNOSIS — B34.9 ACUTE VIRAL SYNDROME: Primary | ICD-10-CM

## 2018-04-27 PROCEDURE — 3077F SYST BP >= 140 MM HG: CPT | Mod: CPTII,S$GLB,, | Performed by: NURSE PRACTITIONER

## 2018-04-27 PROCEDURE — 3080F DIAST BP >= 90 MM HG: CPT | Mod: CPTII,S$GLB,, | Performed by: NURSE PRACTITIONER

## 2018-04-27 PROCEDURE — 99214 OFFICE O/P EST MOD 30 MIN: CPT | Mod: S$GLB,,, | Performed by: NURSE PRACTITIONER

## 2018-04-27 NOTE — LETTER
April 27, 2018      [facility]  [place] [address]  Phone: [phone]  Fax: [phone]       Patient: Andrew Montes   YOB: 1990  Date of Visit: 04/27/2018    To Whom It May Concern:    Luis Montes  was at [facility] on 04/27/2018. He may return to work/school on 04/28/18 with no restrictions. If you have any questions or concerns, or if I can be of further assistance, please do not hesitate to contact me.    Sincerely,    Carrie Martins NP

## 2018-04-27 NOTE — PATIENT INSTRUCTIONS
"  Viral Syndrome (Adult)  A viral illness may cause a number of symptoms. The symptoms depend on the part of the body that the virus affects. If it settles in your nose, throat, and lungs, it may cause cough, sore throat, congestion, and sometimes headache. If it settles in your stomach and intestinal tract, it may cause vomiting and diarrhea. Sometimes it causes vague symptoms like "aching all over," feeling tired, loss of appetite, or fever.  A viral illness usually lasts 1 to 2 weeks, but sometimes it lasts longer. In some cases, a more serious infection can look like a viral syndrome in the first few days of the illness. You may need another exam and additional tests to know the difference. Watch for the warning signs listed below.  Home care  Follow these guidelines for taking care of yourself at home:  · If symptoms are severe, rest at home for the first 2 to 3 days.  · Stay away from cigarette smoke - both your smoke and the smoke from others.  · You may use over-the-counter acetaminophen or ibuprofen for fever, muscle aching, and headache, unless another medicine was prescribed for this. If you have chronic liver or kidney disease or ever had a stomach ulcer or GI bleeding, talk with your doctor before using these medicines. No one who is younger than 18 and ill with a fever should take aspirin. It may cause severe disease or death.  · Your appetite may be poor, so a light diet is fine. Avoid dehydration by drinking 8 to 12 8-ounce glasses of fluids each day. This may include water; orange juice; lemonade; apple, grape, and cranberry juice; clear fruit drinks; electrolyte replacement and sports drinks; and decaffeinated teas and coffee. If you have been diagnosed with a kidney disease, ask your doctor how much and what types of fluids you should drink to prevent dehydration. If you have kidney disease, drinking too much fluid can cause it build up in the your body and be dangerous to your " health.  · Over-the-counter remedies won't shorten the length of the illness but may be helpful for cough, sore throat; and nasal and sinus congestion. Don't use decongestants if you have high blood pressure.  Follow-up care  Follow up with your healthcare provider if you do not improve over the next week.  Call 911  Get emergency medical care if any of the following occur:  · Convulsion  · Feeling weak, dizzy, or like you are going to faint  · Chest pain, shortness of breath, wheezing, or difficulty breathing  When to seek medical advice  Call your healthcare provider right away if any of these occur:  · Cough with lots of colored sputum (mucus) or blood in your sputum  · Chest pain, shortness of breath, wheezing, or difficulty breathing  · Severe headache; face, neck, or ear pain  · Severe, constant pain in the lower right side of your belly (abdominal)  · Continued vomiting (cant keep liquids down)  · Frequent diarrhea (more than 5 times a day); blood (red or black color) or mucus in diarrhea  · Feeling weak, dizzy, or like you are going to faint  · Extreme thirst  · Fever of 100.4°F (38°C) or higher, or as directed by your healthcare provider  Date Last Reviewed: 9/25/2015  © 6071-4899 [employer]. [address] [place] [address], [place] [address]. All rights reserved. This information is not intended as a substitute for professional medical care. Always follow your healthcare professional's instructions.    Symptomatic treatment:    Tylenol every 4 hours  Ibuprofen ever 6 hours  salt water gargles  Cold-eeze helps to reduce the duration of sore throat symptoms  Cepachol helps to numb the discomfort  Chloroseptic spray  Nasal saline spray reduces inflammation and dryness  Warm face compresses as often as you can  Vicks vapor rub at night  Flonase OTC or Nasacort OTC  Simple foods like chicken noodle soup help  Delsym helps with coughing at night  Zyrtec/Claritin during the day and Benadryl at night may  "help if allergy component   Rest as much as you can                                                          If we discussed that I think your illness is viral it will not respond to antibiotics and it will last 10-14 days.  Flonase (fluticasone) is a nasal spray which is available over the counter and may help with your symptoms   Zyrtec D, Claritin D or allegra D can also help with symptoms of congestion and drainage.   If you have hypertension avoid using the "D" which is the decongestant   If you just have drainage you can take plain zyrtec, claritin or allegra   If you just have a congested feeling you can take pseudoephedrine (unless you have high blood pressure) which you have to sign for behind the counter. Do not buy the phenylephrine which is on the shelf as it is not effective   Tylenol or ibuprofen can also be used as directed for pain unless you have an allergy to them or medical condition such as stomach ulcers, kidney or liver disease or blood thinners etc for which you should not be taking these type of medications.   If you are flying in the next few days Afrin nose drops for the airplane flight upon take off and landing may help. Other than at those times refrain from using afrin.       Please arrange follow up with your primary medical clinic as soon as possible. You must understand that you've received an Urgent Care treatment only and that you may be released before all of your medical problems are known or treated. You, the patient, will arrange for follow up as instructed. If your symptoms worsen or fail to improve you should go to the Emergency Room.      "

## 2018-04-27 NOTE — PROGRESS NOTES
"Subjective:       Patient ID: Andrew Montes is a 28 y.o. male.    Vitals:  height is 5' 11" (1.803 m) and weight is 83.9 kg (185 lb). His tympanic temperature is 97.6 °F (36.4 °C). His blood pressure is 143/95 (abnormal) and his pulse is 112 (abnormal). His respiration is 16 and oxygen saturation is 97%.     Chief Complaint: Sinus Problem    Patient states his symptoms started on 4/23/2018 and have since resolved. Pt needs note to return to work.       Sinus Problem   This is a new problem. The current episode started in the past 7 days. The problem is unchanged. There has been no fever. His pain is at a severity of 4/10. The pain is mild. Associated symptoms include sinus pressure and sneezing. Pertinent negatives include no chills, congestion, coughing, ear pain, headaches, hoarse voice, shortness of breath or sore throat. Treatments tried: Nyquil. The treatment provided mild relief.     Review of Systems   Constitution: Negative for chills, fever and malaise/fatigue.   HENT: Positive for sinus pressure and sneezing. Negative for congestion, ear pain, hoarse voice and sore throat.    Eyes: Negative for discharge and redness.   Cardiovascular: Negative for chest pain, dyspnea on exertion and leg swelling.   Respiratory: Negative for cough, shortness of breath, sputum production and wheezing.    Musculoskeletal: Negative for myalgias.   Gastrointestinal: Negative for abdominal pain and nausea.   Neurological: Negative for headaches.   All other systems reviewed and are negative.      Objective:      Physical Exam   Constitutional: He is oriented to person, place, and time. He appears well-developed and well-nourished. He is cooperative.  Non-toxic appearance. He does not appear ill. No distress.   HENT:   Head: Normocephalic and atraumatic.   Right Ear: Hearing, tympanic membrane, external ear and ear canal normal.   Left Ear: Hearing, tympanic membrane, external ear and ear canal normal.   Nose: Nose normal. No " mucosal edema, rhinorrhea or nasal deformity. No epistaxis. Right sinus exhibits no maxillary sinus tenderness and no frontal sinus tenderness. Left sinus exhibits no maxillary sinus tenderness and no frontal sinus tenderness.   Mouth/Throat: Uvula is midline, oropharynx is clear and moist and mucous membranes are normal. No trismus in the jaw. Normal dentition. No uvula swelling. No posterior oropharyngeal erythema.   Eyes: Conjunctivae and lids are normal. No scleral icterus.   Sclera clear bilat   Neck: Trachea normal, full passive range of motion without pain and phonation normal. Neck supple.   Cardiovascular: Normal rate, regular rhythm, normal heart sounds, intact distal pulses and normal pulses.    Pulmonary/Chest: Effort normal and breath sounds normal. No respiratory distress.   Abdominal: Soft. Normal appearance and bowel sounds are normal. He exhibits no distension. There is no tenderness.   Musculoskeletal: Normal range of motion. He exhibits no edema or deformity.   Neurological: He is alert and oriented to person, place, and time. He exhibits normal muscle tone. Coordination normal.   Skin: Skin is warm, dry and intact. He is not diaphoretic. No pallor.   Psychiatric: He has a normal mood and affect. His speech is normal and behavior is normal. Judgment and thought content normal. Cognition and memory are normal.   Nursing note and vitals reviewed.      Assessment:       1. Acute viral syndrome        Plan:         Acute viral syndrome

## 2018-04-30 ENCOUNTER — TELEPHONE (OUTPATIENT)
Dept: URGENT CARE | Facility: CLINIC | Age: 28
End: 2018-04-30

## 2018-06-12 ENCOUNTER — OFFICE VISIT (OUTPATIENT)
Dept: URGENT CARE | Facility: CLINIC | Age: 28
End: 2018-06-12
Payer: COMMERCIAL

## 2018-06-12 VITALS
SYSTOLIC BLOOD PRESSURE: 132 MMHG | DIASTOLIC BLOOD PRESSURE: 80 MMHG | BODY MASS INDEX: 26.18 KG/M2 | TEMPERATURE: 98 F | RESPIRATION RATE: 16 BRPM | WEIGHT: 187 LBS | HEART RATE: 60 BPM | OXYGEN SATURATION: 97 % | HEIGHT: 71 IN

## 2018-06-12 DIAGNOSIS — N20.0 KIDNEY STONES: Primary | ICD-10-CM

## 2018-06-12 PROCEDURE — 3079F DIAST BP 80-89 MM HG: CPT | Mod: CPTII,S$GLB,, | Performed by: PHYSICIAN ASSISTANT

## 2018-06-12 PROCEDURE — 3075F SYST BP GE 130 - 139MM HG: CPT | Mod: CPTII,S$GLB,, | Performed by: PHYSICIAN ASSISTANT

## 2018-06-12 PROCEDURE — 99214 OFFICE O/P EST MOD 30 MIN: CPT | Mod: S$GLB,,, | Performed by: PHYSICIAN ASSISTANT

## 2018-06-12 NOTE — PROGRESS NOTES
"Subjective:       Patient ID: Andrew Montes is a 28 y.o. male.    Vitals:  height is 5' 11" (1.803 m) and weight is 84.8 kg (187 lb). His temperature is 97.8 °F (36.6 °C). His blood pressure is 132/80 and his pulse is 60. His respiration is 16 and oxygen saturation is 97%.     Chief Complaint: Letter for School/Work (NEEDS NOTE TO RETURN TO WORK AFTER PASSING KIDNEY STONES)    PT REPORTS MISSING WORK OVER THE WEEKEND DUE TO PASSING 2 KIDNEY STONES. PT HAS HX OF STONES WITH LITHOTRIPSY AND URETAL STENT THAT WAS EVENTUALLY REMOVED. PT JUST NEEDS NOTE AND CLEARANCE TO RETURN TO WORK TOMORROW.      Review of Systems   Constitution: Negative for chills and fever.   HENT: Negative for sore throat.    Eyes: Negative for blurred vision.   Cardiovascular: Negative for chest pain.   Respiratory: Negative for shortness of breath.    Skin: Negative for rash.   Musculoskeletal: Negative for back pain and joint pain.   Gastrointestinal: Negative for abdominal pain, diarrhea, nausea and vomiting.   Genitourinary:        KIDNEY STONES   Neurological: Negative for headaches.   Psychiatric/Behavioral: The patient is not nervous/anxious.        Objective:      Physical Exam   Constitutional: He is oriented to person, place, and time. Vital signs are normal. He appears well-developed and well-nourished. No distress.   HENT:   Head: Normocephalic and atraumatic.   Right Ear: External ear normal.   Left Ear: External ear normal.   Nose: Nose normal.   Eyes: Conjunctivae and EOM are normal. Right eye exhibits no discharge. Left eye exhibits no discharge. No scleral icterus.   Neck: Normal range of motion. Neck supple.   Cardiovascular: Normal rate, regular rhythm and normal heart sounds.  Exam reveals no gallop and no friction rub.    No murmur heard.  Pulmonary/Chest: Effort normal and breath sounds normal. No respiratory distress. He has no decreased breath sounds. He has no wheezes. He has no rhonchi. He has no rales.   Abdominal: " Normal appearance and bowel sounds are normal. There is no tenderness. There is no rigidity, no rebound, no guarding, no CVA tenderness, no tenderness at McBurney's point and negative Trevino's sign.   Musculoskeletal: Normal range of motion.   Neurological: He is alert and oriented to person, place, and time.   Skin: Skin is warm and dry. No rash noted. He is not diaphoretic. No erythema.   Psychiatric: He has a normal mood and affect. His behavior is normal.   Nursing note and vitals reviewed.      Assessment:       1. Kidney stones        Plan:       Pt has a history of kidney stones and already passed the kidney stones over the weekend. He presents today with no symptoms and needs a return to work note.   Kidney stones

## 2018-06-12 NOTE — LETTER
June 12, 2018      [facility]  [place] [address]  Phone: [phone]  Fax: [phone]       Patient: Andrew Montes   YOB: 1990  Date of Visit: 06/12/2018    To Whom It May Concern:    Luis Montes  was at [facility] on 06/12/2018. He may return to work/school on 6/12/2018 with no restrictions. If you have any questions or concerns, or if I can be of further assistance, please do not hesitate to contact me.    Sincerely,    Kimmy Vera PA-C

## 2018-06-13 ENCOUNTER — LAB VISIT (OUTPATIENT)
Dept: LAB | Facility: HOSPITAL | Age: 28
End: 2018-06-13
Attending: UROLOGY
Payer: COMMERCIAL

## 2018-06-13 ENCOUNTER — OFFICE VISIT (OUTPATIENT)
Dept: UROLOGY | Facility: CLINIC | Age: 28
End: 2018-06-13
Payer: COMMERCIAL

## 2018-06-13 VITALS
HEIGHT: 71 IN | DIASTOLIC BLOOD PRESSURE: 78 MMHG | HEART RATE: 68 BPM | TEMPERATURE: 98 F | BODY MASS INDEX: 26.18 KG/M2 | WEIGHT: 187 LBS | SYSTOLIC BLOOD PRESSURE: 125 MMHG

## 2018-06-13 DIAGNOSIS — N20.0 NEPHROLITHIASIS: ICD-10-CM

## 2018-06-13 DIAGNOSIS — N20.0 NEPHROLITHIASIS: Primary | ICD-10-CM

## 2018-06-13 LAB
ANION GAP SERPL CALC-SCNC: 7 MMOL/L
BILIRUB SERPL-MCNC: ABNORMAL MG/DL
BLOOD URINE, POC: ABNORMAL
BUN SERPL-MCNC: 13 MG/DL
CALCIUM SERPL-MCNC: 10.1 MG/DL
CHLORIDE SERPL-SCNC: 104 MMOL/L
CO2 SERPL-SCNC: 30 MMOL/L
COLOR, POC UA: YELLOW
CREAT SERPL-MCNC: 1.1 MG/DL
EST. GFR  (AFRICAN AMERICAN): >60 ML/MIN/1.73 M^2
EST. GFR  (NON AFRICAN AMERICAN): >60 ML/MIN/1.73 M^2
GLUCOSE SERPL-MCNC: 101 MG/DL
GLUCOSE UR QL STRIP: NORMAL
KETONES UR QL STRIP: ABNORMAL
LEUKOCYTE ESTERASE URINE, POC: ABNORMAL
NITRITE, POC UA: ABNORMAL
PH, POC UA: 7
POTASSIUM SERPL-SCNC: 3.8 MMOL/L
PROTEIN, POC: ABNORMAL
SODIUM SERPL-SCNC: 141 MMOL/L
SPECIFIC GRAVITY, POC UA: 1.01
UROBILINOGEN, POC UA: NORMAL

## 2018-06-13 PROCEDURE — 3074F SYST BP LT 130 MM HG: CPT | Mod: CPTII,S$GLB,, | Performed by: UROLOGY

## 2018-06-13 PROCEDURE — 3078F DIAST BP <80 MM HG: CPT | Mod: CPTII,S$GLB,, | Performed by: UROLOGY

## 2018-06-13 PROCEDURE — 80048 BASIC METABOLIC PNL TOTAL CA: CPT

## 2018-06-13 PROCEDURE — 36415 COLL VENOUS BLD VENIPUNCTURE: CPT

## 2018-06-13 PROCEDURE — 3008F BODY MASS INDEX DOCD: CPT | Mod: CPTII,S$GLB,, | Performed by: UROLOGY

## 2018-06-13 PROCEDURE — 81002 URINALYSIS NONAUTO W/O SCOPE: CPT | Mod: S$GLB,,, | Performed by: UROLOGY

## 2018-06-13 PROCEDURE — 99999 PR PBB SHADOW E&M-EST. PATIENT-LVL III: CPT | Mod: PBBFAC,,, | Performed by: UROLOGY

## 2018-06-13 PROCEDURE — 99215 OFFICE O/P EST HI 40 MIN: CPT | Mod: 25,S$GLB,, | Performed by: UROLOGY

## 2018-06-13 NOTE — PROGRESS NOTES
HPI:  Andrew Montes is a 28 y.o. year old male that  presents with   Chief Complaint   Patient presents with    Follow-up   .  Patient comes in follow-up for history kidney stones.  He states that he passed 2 stones a few days ago and that he needs a note to return to work.  Patient currently without pain.  He did not recover the stones.  There is a family history of kidney stones    Patient is new to me however saw  [facility] 2016 whose notes are reviewed.  This shows patient had ureteroscopy and stone extraction for calcium oxalate stone.  KUB from 2016 reviewed by me which shows no residual stones    There is no family history of kidney bladder prostate cancer    Further chart review shows GFR greater than 60 October 2017          Past Medical History:   Diagnosis Date    Gilbert's syndrome     Hyperhidrosis     Kidney stone     Kidney stones     Pars defect of lumbar spine     L4    Social anxiety disorder      Social History     Social History    Marital status: Single     Spouse name: N/A    Number of children: N/A    Years of education: N/A     Occupational History    [facility]      Social History Main Topics    Smoking status: Former Smoker    Smokeless tobacco: Never Used    Alcohol use Yes      Comment: OCCAISIONALLY    Drug use: No      Comment: Friday and Saturday 10/21&22 first use of marijuana in several years    Sexual activity: Not on file     Other Topics Concern    Not on file     Social History Narrative    No narrative on file     Past Surgical History:   Procedure Laterality Date    EYE SURGERY      foreign body removal    KIDNEY STONE SURGERY      LITHOTRIPSY       Family History   Problem Relation Age of Onset    Anxiety disorder Mother     No Known Problems Father     No Known Problems Sister            Review of Systems  The patient has no chest pains.  The patient has no shortness of breath  Patient wears  no     glasses.  All other review of systems are  "negative.      Physical Exam:  /78 (BP Location: Left arm, Patient Position: Sitting, BP Method: Large (Automatic))   Pulse 68   Temp 98.3 °F (36.8 °C) (Oral)   Ht 5' 11" (1.803 m)   Wt 84.8 kg (187 lb)   BMI 26.08 kg/m²   General appearance: alert, cooperative, no distress  Constitutional:Oriented to person, place, and time.appears well-developed and well-nourished.   HEENT: Normocephalic, atraumatic, neck symmetrical, no nasal discharge   Eyes: conjunctivae/corneas clear, PERRL, EOM's intact  Lungs: clear to auscultation bilaterally, no dullness to percussion bilaterally  Heart: regular rate and rhythm without rub; no displacement of the PMI   Abdomen: soft, non-tender; bowel sounds normoactive; no organomegaly  :  Penis/perineum without lesions, scrotum without rash/cysts, epididymis nontender bilaterally, urethral meatus in normal location normal size, no penile plaques palpated testes equal in size without masses.  Extremities: extremities symmetric; no clubbing, cyanosis, or edema  Integument: Skin color, texture, turgor normal; no rashes; hair distrubution normal  Neurologic: Alert and oriented X 3, normal strength, normal coordination and gait  Psychiatric: no pressured speech; normal affect; no evidence of impaired cognition     LABS:    Complete Blood Count  Lab Results   Component Value Date    RBC 4.66 07/13/2017    HGB 14.9 07/13/2017    HCT 41.5 07/13/2017    MCV 89 07/13/2017    MCH 32.0 (H) 07/13/2017    MCHC 35.9 07/13/2017    RDW 12.0 07/13/2017     07/13/2017    MPV 10.0 07/13/2017    GRAN 8.5 (H) 07/13/2017    GRAN 71.0 07/13/2017    LYMPH 2.4 07/13/2017    LYMPH 20.4 07/13/2017    MONO 0.9 07/13/2017    MONO 7.8 07/13/2017    EOS 0.0 07/13/2017    BASO 0.03 07/13/2017    EOSINOPHIL 0.3 07/13/2017    BASOPHIL 0.3 07/13/2017    DIFFMETHOD Automated 07/13/2017       Comprehensive Metabolic Panel  Lab Results   Component Value Date     10/25/2017    BUN 13 10/25/2017    " CREATININE 1.0 10/25/2017     10/25/2017    K 4.0 10/25/2017     10/25/2017    PROT 7.0 10/25/2017    ALBUMIN 3.8 10/25/2017    BILITOT 1.6 (H) 10/25/2017    AST 24 10/25/2017    ALKPHOS 72 10/25/2017    CO2 31 (H) 10/25/2017    ALT 32 10/25/2017    ANIONGAP 6 (L) 10/25/2017    EGFRNONAA >60 10/25/2017    ESTGFRAFRICA >60 10/25/2017       PSA  No results found for: PSA      Assessment:    ICD-10-CM ICD-9-CM    1. Nephrolithiasis N20.0 592.0 POCT URINE DIPSTICK WITHOUT MICROSCOPE      CT Renal Stone Study ABD Pelvis WO      Basic metabolic panel     The encounter diagnosis was Nephrolithiasis.      Plan:  1.  Nephrolithiasis.  Plan.  Note is given to the patient stating the can return to work at full activity starting 06/14/2018.  He states that his work requires documentation of urinalysis.  Note will be made in this return to work slip that dip urine is negative.  Patient also states that he needs a BMP showing normal renal function.  He will get that drawn today and will obtain results from the portal to give to his work    I discussed that it would be reasonable to obtain stone protocol CT to assess current stone status.  Patient states that he is agreeable to this and that he will call back once he knows his work schedule to work on scheduling the CT scan.  I will call patient by telephone to review results of this upcoming CT    At this point follow up with me on an as-needed basis depending on results of CT  Orders Placed This Encounter   Procedures    CT Renal Stone Study ABD Pelvis WO    Basic metabolic panel    POCT URINE DIPSTICK WITHOUT MICROSCOPE           Tyler Hutton MD    PLEASE NOTE:  Please be advised that portions of this note were dictated using voice recognition software and may contain dictation related errors in spelling/grammar/appropriate pronouns/syntax or other errors that might have not been found and or corrected on text review.

## 2018-06-13 NOTE — LETTER
June 13, 2018      [facility]  [address] [place] [address] Ave  [place] [address]  Phone: [phone]       Patient: Andrew Montes   YOB: 1990  Date of Visit: 06/13/2018    To Whom It May Concern:    Luis Montes  was at [facility] on 06/13/2018. He may return to work/school on 06/14/2018 with no restrictions. If you have any questions or concerns, or if I can be of further assistance, please do not hesitate to contact me.Patient urine test was negative.    Sincerely,    Tyler Hurtado MD

## 2018-06-14 ENCOUNTER — TELEPHONE (OUTPATIENT)
Dept: UROLOGY | Facility: CLINIC | Age: 28
End: 2018-06-14

## 2018-06-14 NOTE — TELEPHONE ENCOUNTER
----- Message from Chelsea Kim sent at 6/14/2018 10:37 AM CDT -----  Contact: [phone]  Patient advised his job need the office visits notes from the appt on yesterday faxed to them @ [phone]. Please call and advise.

## 2018-06-14 NOTE — TELEPHONE ENCOUNTER
"Spoke with patient, he printed his labs from portal and faxed to his employer.  Work excuse was given to patient upon departure yesterday.  Informed if employer is requesting detailed chart notes, he should fill out a release of medical records giving us permission to release his records.  Voiced understanding.  Patient stated he has not heard from his employer after faxing labs to them, if we don't hear anything from him, he states "everything is good"  Asked to keep us informed.  "

## 2020-01-13 ENCOUNTER — PATIENT OUTREACH (OUTPATIENT)
Dept: ADMINISTRATIVE | Facility: HOSPITAL | Age: 30
End: 2020-01-13

## 2023-11-13 ENCOUNTER — OCCUPATIONAL HEALTH (OUTPATIENT)
Dept: URGENT CARE | Facility: CLINIC | Age: 33
End: 2023-11-13

## 2023-11-13 DIAGNOSIS — Z02.1 PRE-EMPLOYMENT EXAMINATION: Primary | ICD-10-CM

## 2023-11-13 DIAGNOSIS — Z13.9 ENCOUNTER FOR SCREENING: Primary | ICD-10-CM

## 2023-11-13 PROCEDURE — 72100 XR LUMBAR SPINE 2 OR 3 VIEWS: ICD-10-PCS | Mod: S$GLB,,, | Performed by: RADIOLOGY

## 2023-11-13 PROCEDURE — 80305 DRUG TEST PRSMV DIR OPT OBS: CPT | Mod: S$GLB,,,

## 2023-11-13 PROCEDURE — 72100 X-RAY EXAM L-S SPINE 2/3 VWS: CPT | Mod: S$GLB,,, | Performed by: RADIOLOGY

## 2023-11-13 PROCEDURE — 99499 PHYSICAL, BASIC COMPLEXITY: ICD-10-PCS | Mod: S$GLB,,, | Performed by: NURSE PRACTITIONER

## 2023-11-13 PROCEDURE — 80305 OOH COLLECTION ONLY DRUG SCREEN: ICD-10-PCS | Mod: S$GLB,,,

## 2023-11-13 PROCEDURE — 99499 UNLISTED E&M SERVICE: CPT | Mod: S$GLB,,, | Performed by: NURSE PRACTITIONER
